# Patient Record
Sex: FEMALE | Race: WHITE
[De-identification: names, ages, dates, MRNs, and addresses within clinical notes are randomized per-mention and may not be internally consistent; named-entity substitution may affect disease eponyms.]

---

## 2022-11-17 ENCOUNTER — HOSPITAL ENCOUNTER (OUTPATIENT)
Dept: HOSPITAL 46 - ED | Age: 72
Setting detail: OBSERVATION
LOS: 1 days | Discharge: HOME | End: 2022-11-18
Attending: INTERNAL MEDICINE | Admitting: INTERNAL MEDICINE
Payer: COMMERCIAL

## 2022-11-17 VITALS — HEIGHT: 62 IN | BODY MASS INDEX: 17.49 KG/M2 | WEIGHT: 95.02 LBS

## 2022-11-17 DIAGNOSIS — I10: ICD-10-CM

## 2022-11-17 DIAGNOSIS — I16.0: ICD-10-CM

## 2022-11-17 DIAGNOSIS — I16.1: ICD-10-CM

## 2022-11-17 DIAGNOSIS — G45.9: Primary | ICD-10-CM

## 2022-11-17 DIAGNOSIS — M81.0: ICD-10-CM

## 2022-11-17 DIAGNOSIS — K21.9: ICD-10-CM

## 2022-11-17 DIAGNOSIS — Z20.822: ICD-10-CM

## 2022-11-17 DIAGNOSIS — Z88.2: ICD-10-CM

## 2022-11-17 PROCEDURE — C9803 HOPD COVID-19 SPEC COLLECT: HCPCS

## 2022-11-17 PROCEDURE — A9270 NON-COVERED ITEM OR SERVICE: HCPCS

## 2022-11-17 PROCEDURE — U0003 INFECTIOUS AGENT DETECTION BY NUCLEIC ACID (DNA OR RNA); SEVERE ACUTE RESPIRATORY SYNDROME CORONAVIRUS 2 (SARS-COV-2) (CORONAVIRUS DISEASE [COVID-19]), AMPLIFIED PROBE TECHNIQUE, MAKING USE OF HIGH THROUGHPUT TECHNOLOGIES AS DESCRIBED BY CMS-2020-01-R: HCPCS

## 2022-11-17 NOTE — NUR
PT ARRIVES TO CCU VIA STRETCHER FROM ED ACCOMPANIED BY . PT ALERT AND
ORIENTED, ABLE TO SELF TRANSFER FROM STRETCHER TO BED. NO NEURO DEFECIT NOTED
UPON NEURO ASSESSMENT. PT DENIES PAIN AND NAUSEA. DOES REQUEST SNACK TO EAT
-PROVIDED.
 
BP REMAINS ELEVATED /93
BUT BELOW PRN IV MED PARAMETERS. PT ASYMPTOMATIC WITH THIS.
 IV SITE PATENT X2.

## 2022-11-17 NOTE — NUR
RN IN ROOM TO ROUND ON PT - STANDBY ASSIST TO BATHROOM TO VOID, STEADY ON FEET
AND WITHOUT DIZZINES. PT DENIES COMPLAINTS URINATING. BACK TO BED WITH CALL
LIGHT IN REACH. PT DENIES FURTHER NEEDS.

## 2022-11-17 NOTE — NUR
RN IN ROOM TO ADMINISTER SCHEDULED MEDICATIONS. PT CONTINUES TO NOT SHOW
EXPRESSIVE APHASIA PREVIOUSLY DEMONSTRATED IN ED.

## 2022-11-18 NOTE — NUR
REPORT RECEIVED FROM GERRY AVILA. PT HAS  AND ANOTHER VISITOR IN ROOM,
BROUGHT BREAKFAST IN TO PT, DENIES ANY NEEDS AT THIS TIME.

## 2022-11-18 NOTE — NUR
RN IN ROOM TO ASSESS PT - PT WAKES EASILY WITH SOUND, NEURO ASSESSMENT
COMPLETE WITH NO DEFECIT NOTED, NO CHANGE. PT DENIES PAIN AND ANY NEEDS AT
THIS TIME. BP IMPROVING WITH REST.

## 2022-11-18 NOTE — NUR
RN IN ROOM TO ASSESS PT - PT WAKES EASILY WITH TOUCH. BP IMPROVED THROUGH
SHIFT, 'S/80'S AFTER AMBULATION TO BATHROOM. NEURO ASSESSMENT NEGATIVE
FOR ANY DEFICIT. PT DENIES PAIN OR ANY ILL FEELING. STEADY ON FEET WITH
AMBULATION TO BATHROOM. MRI SCREENING FORM COMPLETE. PT DENIES FURTHER NEEDS,
ALL QUESTIONS ANSWERED REGARDING POC.

## 2022-11-18 NOTE — NUR
Resting in bed.Reviewed chart and no DC needs noted at this time.Patient has a
supportive family.Will reassess plan of care ongoing.

## 2022-11-19 NOTE — EKG
[facility]
                                    [address]
                                  [place], [place]  [address]
_________________________________________________________________________________________
                                                                 Signed   
 
 
Normal sinus rhythm
Normal ECG
No previous ECGs available
Confirmed by KALYN PEÑA MD ([id number]) on 11/19/2022 1:55:35 PM
 
 
 
 
 
 
 
 
 
 
 
 
 
 
 
 
 
 
 
 
 
 
 
 
 
 
 
 
 
 
 
 
 
 
 
 
 
 
 
 
 
    Electronically Signed By: KALYN PEÑA MD  11/19/22 1355
_________________________________________________________________________________________
PATIENT NAME:     BURT ADORNO                     
MEDICAL RECORD #: [id number]                     Electrocardiogram             
          ACCT #: [id number]  
DATE OF BIRTH:   [birth year]                                       
PHYSICIAN:   KALYN PEÑA MD                    REPORT #: [id number]
REPORT IS CONFIDENTIAL AND NOT TO BE RELEASED WITHOUT AUTHORIZATION